# Patient Record
Sex: MALE
[De-identification: names, ages, dates, MRNs, and addresses within clinical notes are randomized per-mention and may not be internally consistent; named-entity substitution may affect disease eponyms.]

---

## 2018-08-16 ENCOUNTER — HOSPITAL ENCOUNTER (INPATIENT)
Dept: HOSPITAL 92 - ERS | Age: 43
LOS: 2 days | Discharge: HOME | DRG: 388 | End: 2018-08-18
Attending: HOSPITALIST | Admitting: HOSPITALIST
Payer: SELF-PAY

## 2018-08-16 VITALS — BODY MASS INDEX: 27.9 KG/M2

## 2018-08-16 DIAGNOSIS — E87.6: ICD-10-CM

## 2018-08-16 DIAGNOSIS — K57.31: ICD-10-CM

## 2018-08-16 DIAGNOSIS — K56.609: Primary | ICD-10-CM

## 2018-08-16 DIAGNOSIS — K64.8: ICD-10-CM

## 2018-08-16 DIAGNOSIS — D50.9: ICD-10-CM

## 2018-08-16 DIAGNOSIS — K62.5: ICD-10-CM

## 2018-08-16 DIAGNOSIS — E66.9: ICD-10-CM

## 2018-08-16 LAB
ALBUMIN SERPL BCG-MCNC: 4.1 G/DL (ref 3.5–5)
ALP SERPL-CCNC: 103 U/L (ref 40–150)
ALT SERPL W P-5'-P-CCNC: 31 U/L (ref 8–55)
ANION GAP SERPL CALC-SCNC: 11 MMOL/L (ref 10–20)
ANISOCYTOSIS BLD QL SMEAR: (no result) (100X)
APTT PPP: 23.9 SEC (ref 22.9–36.1)
AST SERPL-CCNC: 32 U/L (ref 5–34)
BASOPHILS # BLD AUTO: 0 THOU/UL (ref 0–0.2)
BASOPHILS NFR BLD AUTO: 0.4 % (ref 0–1)
BILIRUB SERPL-MCNC: 1.5 MG/DL (ref 0.2–1.2)
BUN SERPL-MCNC: 11 MG/DL (ref 8.9–20.6)
CALCIUM SERPL-MCNC: 8.7 MG/DL (ref 7.8–10.44)
CHLORIDE SERPL-SCNC: 103 MMOL/L (ref 98–107)
CK MB SERPL-MCNC: 2.4 NG/ML (ref 0–6.6)
CO2 SERPL-SCNC: 25 MMOL/L (ref 22–29)
CREAT CL PREDICTED SERPL C-G-VRATE: 0 ML/MIN (ref 70–130)
EOSINOPHIL # BLD AUTO: 0.1 THOU/UL (ref 0–0.7)
EOSINOPHIL NFR BLD AUTO: 2 % (ref 0–10)
GLOBULIN SER CALC-MCNC: 2.7 G/DL (ref 2.4–3.5)
GLUCOSE SERPL-MCNC: 141 MG/DL (ref 70–105)
HGB BLD-MCNC: 6.4 G/DL (ref 14–18)
HGB BLD-MCNC: 8.3 G/DL (ref 14–18)
INR PPP: 1.1
IRON SERPL-MCNC: 13 UG/DL (ref 65–175)
LIPASE SERPL-CCNC: 23 U/L (ref 8–78)
LYMPHOCYTES # BLD: 1 THOU/UL (ref 1.2–3.4)
LYMPHOCYTES NFR BLD AUTO: 18.8 % (ref 21–51)
MCH RBC QN AUTO: 16.3 PG (ref 27–31)
MCH RBC QN AUTO: 18.1 PG (ref 27–31)
MCV RBC AUTO: 56.6 FL (ref 78–98)
MCV RBC AUTO: 62.3 FL (ref 78–98)
MDIFF COMPLETE?: YES
MICROCYTES BLD QL SMEAR: (no result) (100X)
MONOCYTES # BLD AUTO: 0.4 THOU/UL (ref 0.11–0.59)
MONOCYTES NFR BLD AUTO: 7.3 % (ref 0–10)
NEUTROPHILS # BLD AUTO: 4 THOU/UL (ref 1.4–6.5)
NEUTROPHILS NFR BLD AUTO: 71.6 % (ref 42–75)
PLATELET # BLD AUTO: 194 THOU/UL (ref 130–400)
PLATELET # BLD AUTO: 195 THOU/UL (ref 130–400)
PLATELET BLD QL SMEAR: (no result)
POTASSIUM SERPL-SCNC: 3.1 MMOL/L (ref 3.5–5.1)
PROTHROMBIN TIME: 14.7 SEC (ref 12–14.7)
RBC # BLD AUTO: 3.93 MILL/UL (ref 4.7–6.1)
RBC # BLD AUTO: 4.59 MILL/UL (ref 4.7–6.1)
REFLEX FOR REVIEW??: YES
RETICS/RBC NFR: 1.7 % (ref 0.5–1.5)
SODIUM SERPL-SCNC: 136 MMOL/L (ref 136–145)
TROPONIN I SERPL DL<=0.01 NG/ML-MCNC: (no result) NG/ML (ref ?–0.03)
UIBC SERPL-MCNC: 424 MCG/DL (ref 261–462)
VIT B12 SERPL-MCNC: 385 PG/ML (ref 211–911)
WBC # BLD AUTO: 5.6 THOU/UL (ref 4.8–10.8)
WBC # BLD AUTO: 5.6 THOU/UL (ref 4.8–10.8)

## 2018-08-16 PROCEDURE — 85025 COMPLETE CBC W/AUTO DIFF WBC: CPT

## 2018-08-16 PROCEDURE — 84484 ASSAY OF TROPONIN QUANT: CPT

## 2018-08-16 PROCEDURE — 83690 ASSAY OF LIPASE: CPT

## 2018-08-16 PROCEDURE — P9016 RBC LEUKOCYTES REDUCED: HCPCS

## 2018-08-16 PROCEDURE — 82274 ASSAY TEST FOR BLOOD FECAL: CPT

## 2018-08-16 PROCEDURE — 30233N1 TRANSFUSION OF NONAUTOLOGOUS RED BLOOD CELLS INTO PERIPHERAL VEIN, PERCUTANEOUS APPROACH: ICD-10-PCS | Performed by: INTERNAL MEDICINE

## 2018-08-16 PROCEDURE — 86850 RBC ANTIBODY SCREEN: CPT

## 2018-08-16 PROCEDURE — 85610 PROTHROMBIN TIME: CPT

## 2018-08-16 PROCEDURE — 76705 ECHO EXAM OF ABDOMEN: CPT

## 2018-08-16 PROCEDURE — 82728 ASSAY OF FERRITIN: CPT

## 2018-08-16 PROCEDURE — 93005 ELECTROCARDIOGRAM TRACING: CPT

## 2018-08-16 PROCEDURE — 96366 THER/PROPH/DIAG IV INF ADDON: CPT

## 2018-08-16 PROCEDURE — 82607 VITAMIN B-12: CPT

## 2018-08-16 PROCEDURE — 85046 RETICYTE/HGB CONCENTRATE: CPT

## 2018-08-16 PROCEDURE — 83550 IRON BINDING TEST: CPT

## 2018-08-16 PROCEDURE — 85060 BLOOD SMEAR INTERPRETATION: CPT

## 2018-08-16 PROCEDURE — 80053 COMPREHEN METABOLIC PANEL: CPT

## 2018-08-16 PROCEDURE — 96365 THER/PROPH/DIAG IV INF INIT: CPT

## 2018-08-16 PROCEDURE — 82553 CREATINE MB FRACTION: CPT

## 2018-08-16 PROCEDURE — 85730 THROMBOPLASTIN TIME PARTIAL: CPT

## 2018-08-16 PROCEDURE — 82746 ASSAY OF FOLIC ACID SERUM: CPT

## 2018-08-16 PROCEDURE — 86901 BLOOD TYPING SEROLOGIC RH(D): CPT

## 2018-08-16 PROCEDURE — 96374 THER/PROPH/DIAG INJ IV PUSH: CPT

## 2018-08-16 PROCEDURE — 36430 TRANSFUSION BLD/BLD COMPNT: CPT

## 2018-08-16 PROCEDURE — 74177 CT ABD & PELVIS W/CONTRAST: CPT

## 2018-08-16 PROCEDURE — 88305 TISSUE EXAM BY PATHOLOGIST: CPT

## 2018-08-16 PROCEDURE — C9113 INJ PANTOPRAZOLE SODIUM, VIA: HCPCS

## 2018-08-16 PROCEDURE — 80048 BASIC METABOLIC PNL TOTAL CA: CPT

## 2018-08-16 PROCEDURE — 36415 COLL VENOUS BLD VENIPUNCTURE: CPT

## 2018-08-16 PROCEDURE — 83540 ASSAY OF IRON: CPT

## 2018-08-16 PROCEDURE — 74178 CT ABD&PLV WO CNTR FLWD CNTR: CPT

## 2018-08-16 PROCEDURE — 96375 TX/PRO/DX INJ NEW DRUG ADDON: CPT

## 2018-08-16 PROCEDURE — 71045 X-RAY EXAM CHEST 1 VIEW: CPT

## 2018-08-16 PROCEDURE — 96368 THER/DIAG CONCURRENT INF: CPT

## 2018-08-16 PROCEDURE — 86900 BLOOD TYPING SEROLOGIC ABO: CPT

## 2018-08-16 RX ADMIN — POTASSIUM CHLORIDE SCH: 14.9 INJECTION, SOLUTION INTRAVENOUS at 08:13

## 2018-08-16 RX ADMIN — Medication SCH ML: at 19:02

## 2018-08-16 RX ADMIN — Medication SCH ML: at 12:59

## 2018-08-16 RX ADMIN — POTASSIUM CHLORIDE SCH: 14.9 INJECTION, SOLUTION INTRAVENOUS at 12:58

## 2018-08-16 NOTE — RAD
ONE VIEW CHEST:

 

History: Dyspnea. 

 

Comparison: None. 

 

FINDINGS: 

One view chest. 

 

Normal cardiac silhouette. The pulmonary vessels are within normal limits. Costophrenic angles are cl
ear. No consolidation or mass. No pneumothorax or osseous abnormality. 

 

IMPRESSION: 

No acute cardiopulmonary process. 

 

POS: [facility]

## 2018-08-16 NOTE — HP
PRIMARY CARE PROVIDER:  [facility].

 

CHIEF COMPLAINT:  Abdominal pain.

 

HISTORY OF PRESENT ILLNESS:  Mr. Koehler is a pleasant 43-year-old gentleman who was seen at [facility] on 08/16/2018.

 

He reports that he has been having on and off bloody bowel movements over the last several weeks.  He
 describes it as watery stools accompanied by blood.  He denies any fevers or chills.

 

Today, he was woken up from sleep with sharp right upper quadrant pain, 10/10, nonradiating, constant
, accompanied by shortness of breath, no known aggravating or relieving factors.  He reports nausea, 
but no vomiting.  He came to the emergency room because of ongoing abdominal pain.  He reports eating
 at [employer] before the symptoms started.

 

REVIEW OF SYSTEMS:  All other systems reviewed and found to be negative.

 

PAST MEDICAL HISTORY:  None.

 

PAST SURGICAL HISTORY:  None.

 

SOCIAL HISTORY:  Occasional alcohol use, no tobacco use or recreational drug use.

 

FAMILY HISTORY:  Sister with uterine cancer, brother with cancer of the face.

 

ALLERGIES:  No known drug allergies.

 

CURRENT MEDICATIONS:  None.

 

PHYSICAL EXAMINATION:

GENERAL:  On examination, Mr. Koehler is awake and alert, not in acute distress.

VITAL SIGNS:  Blood pressure is 115/72, pulse 62, respiratory rate 20, and oxygen saturation 100% on 
3 liters oxygen by nasal cannula.  He is afebrile.

EYES:  No scleral icterus.  No conjunctival pallor.

ENT:  Moist mucosal membranes, no oropharyngeal erythema or exudates.

NECK:  Supple, nontender, trachea is midline.

RESPIRATORY:  Accessory muscles of breathing are not active.  Chest wall movements are symmetric bila
terally.

LUNGS:  Clear to auscultation without wheeze, rhonchi or crepitations.

CARDIOVASCULAR:  S1 and S2 are heard, regular.  Peripheral pulses palpable.  No carotid bruit, no per
icardial rub.

ABDOMEN:  Right upper quadrant is mildly tender.  There is no guarding or rigidity.  Bowel sounds are
 heard.  No hepatomegaly or splenomegaly.  Carlisle' sign is negative.

NEUROLOGIC:  Cranial nerves II-XII intact.  Deep tendon reflexes are 2+.

MUSCULOSKELETAL:  Power is 5/5 in all 4 extremities.

LYMPHATIC:  No cervical lymphadenopathy.

SKIN:  No rashes or subcutaneous nodules.

PSYCHIATRIC:  Normal mood, normal affect, patient is oriented to person, place, and time.

 

LABORATORY DATA:  Mr. Koehler's labs and investigations were reviewed.  He has normal white count, mi
crocytic anemia with hemoglobin 6.4, normal platelet count, INR 1.1, normal sodium, decreased potassi
um of 3.1, normal creatinine, elevated total bilirubin of 1.5, otherwise unremarkable liver profile a
nd normal calcium.

 

I reviewed his electrocardiogram, which shows normal sinus rhythm, no ST changes to suggest an acute 
coronary syndrome.  I also reviewed his chest x-ray, which does not show any pulmonary infiltrates.  
CT scan of the abdomen and pelvis showed dilatation of the small bowel up to 3.6 cm with abrupt colla
pse of the jejunum in the left upper quadrant, suspicious for small-bowel obstruction.

 

ASSESSMENT AND PLAN:  Mr. Koehler  is a pleasant 43-year-old gentleman who was seen at [facility] on 08/16/2018.  His problem list includes:

1.  Abdominal pain:  Etiology unclear at this time, could be related to bowel obstruction.  Patient w
ill be admitted to the hospital for further management.  General Surgery Service has been consulted. 
 They recommend Gastroenterology evaluation.  We will await the same.

2.  Bloody diarrhea.  Patient will likely need scope studies to rule out inflammatory bowel disease. 
 There is no evidence of infection at this time.

3.  Symptomatic anemia:  Patient has received packed RBC transfusion.  We will follow hemoglobin.

4.  Hypokalemia:  Replace potassium, recheck potassium level.

 

We will order iron studies, vitamin B12 and folate levels.

 

Many thanks for allowing me to participate in your patient's care.  Please feel free to contact me wi
th any questions or concerns.

 

LEVEL OF RISK:  High.

 

LEVEL OF COMPLEXITY:  Intermediate.

## 2018-08-16 NOTE — CT
PRELIMINARY REPORT/[facility]/EMERGENTY AFTER-HOURS PROCEDURE 

 

 

CT Abdomen and Pelvis With Intravenous Contrast

 

EXAM DATE/TIME:

Exam ordered 8/16/2018 3:33 AM

 

CLINICAL HISTORY:

43 years old, male; Pain; Abdominal pain; Epigastric; Patient HX: Er 2; M43 presents to the ed C/O ab
dominal pain that started 1 hour ago. Pt. Describes pain as sharp and being located in epigastric are
a pt. States that he was laying down when the pain started. Pt. Reports n/v and SOB. Pt. Reports

that he had a meal from [employer] before the symptoms had started. Pt. Denies having any chronic he
alth complications.

 

TECHNIQUE:

Axial computed tomography images of the abdomen and pelvis with intravenous contrast.

Coronal reformatted images were created and reviewed.

 

COMPARISON:

No relevant prior studies available.

 

FINDINGS:

Lung bases: There is subpleural atelectasis of the dependent portions of the lungs.

 

ABDOMEN:

Liver: There are no focal liver lesions identified.

Gallbladder and bile ducts: The gallbladder is normal. There is no evidence of biliary ductal dilatio
n. No calcified stones.

Pancreas: The pancreas appears normal. No ductal dilation.

Spleen: The spleen is normal.

Adrenals: The adrenal glands are normal.

Kidneys and ureters: The kidneys appear normal. No hydronephrosis.

Stomach and bowel: There is dilatation of the small bowel up to 3.6 cm with abrupt collapse of the je
junum in the LEFT upper quadrant suspicious for small bowel obstruction. The stomach is normal. The d
uodenum is unremarkable. The colon is normal. No mucosal thickening.

 

PELVIS:

Appendix: A normal appendix is identified.

Bladder: The bladder is normal.

Reproductive: The prostate gland and seminal vesicles are normal.

 

ABDOMEN and PELVIS:

Intraperitoneal space: Normal. No free air. No significant fluid collection.

Bones/joints: No acute fracture. No dislocation.

Soft tissues: Normal.

Vasculature: Normal. No abdominal aortic aneurysm.

Lymph nodes: Normal. No enlarged lymph nodes.

 

IMPRESSION:

There is dilatation of the small bowel up to 3.6 cm with abrupt collapse of the jejunum in the LEFT u
pper quadrant suspicious for small bowel obstruction.

 

Thank you for allowing us to participate in the care of your patient.

Dictated and Authenticated by: Juan Horne MD

08/16/2018 5:16 AM Central Time ([place] & [place])

 

 

CT ABDOMEN AND PELVIS WITH IV CONTRAST:

 

FINAL REPORT:

I agree with the preliminary report given by Dr. Horne of [facility].

 

POS: [facility]

## 2018-08-16 NOTE — PDOC.GSCN
Surgery Consult: HPI





- Consult details


Date: 08/16/18


Time: 10:00


Reason for consult: small bowel obstruction


History of present illness: 





Patient is a 43 year old male  that presents after having sharp abdominal pain 

this morning at 2am. Pain located in RUQ and radiates to epigastric area. 

Patient has had this pain in the past, but not as severe. Ate a hamburger at 

[employer] yesterday evening. Abdominal pain has since improved. Endorses 

nausea following the meal. Bowel function unchanged. Denies weight loss. 

Reports bright red blood per rectum.





08/16/18 11:06








Surgery Consult: ROS





- Review of Systems


All systems: 10 systems reviewed and no additional complaints unless stated 

below.





Surgery Consult: [facility]


Source: patient


Past Medical History: 





Denies


Past Surgical History: 





None





- Past Family History


Family history: reviewed and not pertinent (No family history of colon cancer.)





- Past Social History


Smoking Status: Never smoker


Alcohol Use: other (endorses drinking every other day)


Drug Use History: none


Living Situation: 





Surgery Consult: Exam





- Vital signs


Vital signs: 


 Vital Signs - Most Recent











Temp Pulse Resp BP Pulse Ox


 


 98.1 F   62   20   115/72   100 


 


 08/16/18 07:59  08/16/18 07:59  08/16/18 07:59  08/16/18 07:59  08/16/18 07:59














- Physical Exam


General: no distress, well developed, well nourished


Eye: normal ocular movement


Respiratory: normal expansion, normal respiratory effort


Abdomen: soft, tender, bowel sounds


Neurologic: normal coordination


Psychiatric: memory intact, speech is normal





Surgery Consult: Meds





- Medications


MAR Reviewed: Yes


Medications: 


 Current Medications





Acetaminophen (Tylenol)  650 mg [place] Q4H PRN


   PRN Reason: Headache/Fever or Pain


Ondansetron HCl (Zofran)  4 mg IVP Q6H PRN


   PRN Reason: Nausea/Vomiting


   Stop: 08/16/18 18:00


Ondansetron HCl (Zofran Odt)  4 mg PO Q6H PRN


   PRN Reason: Nausea/Vomiting


   Stop: 08/16/18 18:00


Sodium Chloride (Flush - Normal Saline)  10 ml IVF Q12HR KEL


Sodium Chloride (Flush - Normal Saline)  10 ml IVF PRN PRN


   PRN Reason: Saline Flush











- Allergies


Allergies/Adverse Reactions: 


 Allergies











Allergy/AdvReac Type Severity Reaction Status Date / Time


 


No Known Drug Allergies Allergy   Verified 08/16/18 08:14














Surgery Consult: Results





- Labs


Result Diagrams: 


 08/16/18 10:03





 08/16/18 03:05


Lab results: 


 Laboratory Results











WBC  5.6 thou/uL (4.8-10.8)   08/16/18  10:03    


 


RBC  4.59 mill/uL (4.70-6.10)  L  08/16/18  10:03    


 


Hgb  8.3 g/dL (14.0-18.0)  L  08/16/18  10:03    


 


Hct  28.6 % (42.0-52.0)  L  08/16/18  10:03    


 


MCV  62.3 fL (78.0-98.0)  L  08/16/18  10:03    


 


MCH  18.1 pg (27.0-31.0)  L  08/16/18  10:03    


 


MCHC  29.1 g/dL (32.0-36.0)  L  08/16/18  10:03    


 


RDW  24.6 % (11.5-14.5)  H  08/16/18  10:03    


 


Plt Count  194 thou/uL (130-400)   08/16/18  10:03    


 


MPV  6.3 fL (7.4-10.4)  L  08/16/18  10:03    


 


Neutrophils %  71.6 % (42.0-75.0)   08/16/18  10:03    


 


Neutrophils % (Manual)  43 % (42-75)   08/16/18  03:05    


 


Band Neuts % (Manual)  1 % (5-11)  L  08/16/18  03:05    


 


Lymphocytes %  18.8 % (21.0-51.0)  L  08/16/18  10:03    


 


Lymphocytes % (Manual)  49 % (21-51)   08/16/18  03:05    


 


Monocytes %  7.3 % (0.0-10.0)   08/16/18  10:03    


 


Monocytes % (Manual)  5 % (0-10)   08/16/18  03:05    


 


Eosinophils %  2.0 % (0.0-10.0)   08/16/18  10:03    


 


Eosinophils % (Manual)  1 % (0-10)   08/16/18  03:05    


 


Basophils %  0.4 % (0.0-1.0)   08/16/18  10:03    


 


Basophils % (Manual)  1 % (0-2)   08/16/18  03:05    


 


Neutrophils #  4.0 thou/uL (1.40-6.50)   08/16/18  10:03    


 


Lymphocytes #  1.0 thou/uL (1.20-3.40)  L  08/16/18  10:03    


 


Monocytes #  0.4 thou/uL (0.11-0.59)   08/16/18  10:03    


 


Eosinophils #  0.1 thou/uL (0.0-0.7)   08/16/18  10:03    


 


Basophils #  0.0 thou/uL (0.0-0.2)   08/16/18  10:03    


 


Hypochromia  MODERATE=16-30 cells (100X) (0-5/hpf)   08/16/18  03:05    


 


Plt Morphology Comment  Appears Adequate   08/16/18  03:05    


 


Anisocytosis  SLIGHT = 6-15 cells (100X) (0-5/hpf)   08/16/18  03:05    


 


Microcytosis  MARKED = >30 cells (100X) (0-5/hpf)   08/16/18  03:05    


 


Elliptocytes  SLIGHT = 2-5 cells (100X) (0-1/hpf)   08/16/18  03:05    


 


Smear Path Review     08/16/18  03:05    


 


Retic Count  1.7 % (0.5-1.5)  H  08/16/18  10:03    


 


Immature Retic Fraction  0.482 Ratio (0.163-0.362)  H  08/16/18  10:03    


 


PT  14.7 SEC (12.0-14.7)   08/16/18  03:05    


 


INR  1.1   08/16/18  03:05    


 


APTT  23.9 SEC (22.9-36.1)   08/16/18  03:05    


 


Sodium  136 mmol/L (136-145)   08/16/18  03:05    


 


Potassium  3.1 mmol/L (3.5-5.1)  L  08/16/18  03:05    


 


Chloride  103 mmol/L ()   08/16/18  03:05    


 


Carbon Dioxide  25 mmol/L (22-29)   08/16/18  03:05    


 


Anion Gap  11 mmol/L (10-20)   08/16/18  03:05    


 


BUN  11 mg/dL (8.9-20.6)   08/16/18  03:05    


 


Creatinine  0.80 mg/dL (0.6-1.3)   08/16/18  03:05    


 


Estimated GFR (MDRD)  Greater than  90   08/16/18  03:05    


 


Glucose  141 mg/dL ()  H  08/16/18  03:05    


 


Calcium  8.7 mg/dL (7.8-10.44)   08/16/18  03:05    


 


Iron  13 ug/dL ()  L  08/16/18  10:03    


 


TIBC  424 mcg/dL (261-462)   08/16/18  10:03    


 


Total Bilirubin  1.5 mg/dL (0.2-1.2)  H  08/16/18  03:05    


 


AST  32 U/L (5-34)   08/16/18  03:05    


 


ALT  31 U/L (8-55)   08/16/18  03:05    


 


Alkaline Phosphatase  103 U/L ()   08/16/18  03:05    


 


CK-MB (CK-2)  2.4 ng/mL (0-6.6)   08/16/18  03:05    


 


Troponin I  Less than  0.010 ng/mL (< 0.028)   08/16/18  03:05    


 


Serum Total Protein  6.8 g/dL (6.0-8.3)   08/16/18  03:05    


 


Albumin  4.1 g/dL (3.5-5.0)   08/16/18  03:05    


 


Globulin  2.7 g/dL (2.4-3.5)   08/16/18  03:05    


 


Albumin/Globulin Ratio  1.5 g/dL (1.2-2.2)   08/16/18  03:05    


 


Lipase  23 U/L (8-78)   08/16/18  03:05    


 


Blood Type  A POSITIVE   08/16/18  03:35    


 


Antibody Screen  NEGATIVE   08/16/18  03:35    


 


Crossmatch  See Detail   08/16/18  03:35    














Surgery Consult: A/P





- Plan


Plan: 





Consulted for concern of SBO. Reviewed imaging. Considering patient's age and 

no previous surgeries, SBO less likely. Abdominal pain symptoms could be 

suspicious for biliary colic. He has never had a RUQ ultrasound, so one has 

been ordered to evaluate for cause of his pain. Recommend GI consult for workup 

of patient's bloody stools and microcytic anemia. 





Patient has been seen and examined by Dr. Nava, who agrees with plan as noted 

above.

## 2018-08-16 NOTE — ULT
SONOGRAM RIGHT UPPER QUADRANT:

 

History: Right upper quadrant pain. 

 

FINDINGS: 

Gallbladder has a normal appearance. Common duct is 0.5 cm. Liver echogenic without focal mass or int
rahepatic biliary dilatation. No free fluid. 

 

IMPRESSION: 

1. No evidence of gallstones or acute biliary obstruction. 

2. Hepatosteatosis. 

 

POS: [facility]

## 2018-08-16 NOTE — CON
DATE OF CONSULTATION:  08/16/2018

 

GASTROENTEROLOGY CONSULTATION NOTE

 

CHIEF COMPLAINT:  Abdominal pain.

 

HISTORY OF PRESENT ILLNESS:  Mr. Koehler is a 43-year-old man who presented to the emergency room ear
 this morning after he had a sudden onset of right upper quadrant epigastric sharp abdominal pain. 
 The pain was exacerbated by taking a deep breath and movement.  The pain started suddenly about 2:00
 a.m. and then remained constant until he received pain medicine in the emergency room.  He has had s
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  He was found to have severe anemia when he was in [facility] emergency room.  He does report chronic hematochezia.  He sees red blood that will turn the water 
red around once or twice per week as he has a bowel movement.  He had some procedure for hemorrhoids 
back in 2014 in the emergency room, possibly evacuation of clot and external hemorrhoids; however, it
 is unclear exactly.  He has had no diarrhea or constipation lately.  His last bowel movement was a f
ormed normal bowel movement yesterday.  He had no prior endoscopic procedures.

 

PAST MEDICAL HISTORY:  Hemorrhoids

 

PAST SURGICAL HISTORY:  None.

 

FAMILY HISTORY:  Negative for GI malignancy.

 

SOCIAL HISTORY:  He had a half-sister with ovarian or uterine cancer.

 

HABITS:  He has 3 or 4 beers every couple of days.  No smoking or drugs.

 

ALLERGIES:  No known drug allergies.

 

MEDICATIONS PRIOR TO ADMISSION:  None.  No significant NSAID use.

 

REVIEW OF SYSTEMS:  Negative x10 systems reviewed except as stated in history of present illness.

 

PHYSICAL EXAMINATION:

VITAL SIGNS:  Temperature 98.1, pulse 62, blood pressure 115/72.

GENERAL:  He is in no acute distress, alert and oriented x3.

OROPHARYNX:  Clear, without lesions.

HEENT:  Eyes have no scleral icterus.  He has no cervical or supraclavicular lymphadenopathy.

LUNGS:  Clear to auscultation bilaterally.

HEART:  Regular rate and rhythm without murmur.

ABDOMEN:  Soft, minimal tenderness in the right upper quadrant.  Bowel sounds are present.

EXTREMITIES:  No lower extremity edema.

NEUROLOGIC:  Cranial nerves are grossly intact.

 

LABORATORY DATA:  White blood cell count 5.6, hemoglobin is 8.3 after 2 units transfusion, platelets 
194, MCV 56.  Hemoglobin on presentation was 6.4, retic count was elevated at 1.7.  INR 1.1, creatini
ne 0.8.  Iron 13, TIBC 424, bilirubin 1.5, AST 32, ALT 31, alkaline phosphatase 103, albumin 4.1, lip
ase 23.

 

IMPRESSION:

1.  Severe iron deficiency anemia.  He does have some chronic hematochezia with red blood in the toil
et around once or twice per week, most likely related to hemorrhoids.  We will need to rule out lower
 GI bleeding source.  It would be unusual for this degree of bleeding to cause such a severe anemia a
s this is still possible source.

2.  Abnormal CT scan of the abdomen showing dilation of the small bowel up to 3.6 cm with abrupt maggie
apse and jejunum in the left upper quadrant, question of a small bowel lesion or tumor could be possi
ble as a source for iron deficiency anemia.

3.  Right upper quadrant pain.  This was sudden onset and has gone this morning.

 

RECOMMENDATIONS:

1.  EGD and colonoscopy tomorrow.

2.  Ultrasound of the right upper quadrant has been ordered to evaluate the right upper quadrant pain
.

3.  If the EGD and colonoscopy are negative, then the next step will be CT enterography to evaluate f
or small bowel lesion further.  If no significant stricture is seen at this point, then a capsule end
oscopy could be performed as an outpatient.  Ultimately, this could require surgical evaluation.

## 2018-08-17 LAB
ANION GAP SERPL CALC-SCNC: 14 MMOL/L (ref 10–20)
ANISOCYTOSIS BLD QL SMEAR: (no result) (100X)
BASOPHILS # BLD AUTO: 0 THOU/UL (ref 0–0.2)
BASOPHILS NFR BLD AUTO: 0.7 % (ref 0–1)
BUN SERPL-MCNC: 7 MG/DL (ref 8.9–20.6)
CALCIUM SERPL-MCNC: 9 MG/DL (ref 7.8–10.44)
CHLORIDE SERPL-SCNC: 105 MMOL/L (ref 98–107)
CO2 SERPL-SCNC: 24 MMOL/L (ref 22–29)
CREAT CL PREDICTED SERPL C-G-VRATE: 204 ML/MIN (ref 70–130)
EOSINOPHIL # BLD AUTO: 0.2 THOU/UL (ref 0–0.7)
EOSINOPHIL NFR BLD AUTO: 4 % (ref 0–10)
GLUCOSE SERPL-MCNC: 86 MG/DL (ref 70–105)
HGB BLD-MCNC: 8 G/DL (ref 14–18)
LYMPHOCYTES # BLD: 1.5 THOU/UL (ref 1.2–3.4)
LYMPHOCYTES NFR BLD AUTO: 30.3 % (ref 21–51)
MCH RBC QN AUTO: 18.7 PG (ref 27–31)
MCV RBC AUTO: 61.8 FL (ref 78–98)
MDIFF COMPLETE?: YES
MONOCYTES # BLD AUTO: 0.5 THOU/UL (ref 0.11–0.59)
MONOCYTES NFR BLD AUTO: 9.9 % (ref 0–10)
NEUTROPHILS # BLD AUTO: 2.7 THOU/UL (ref 1.4–6.5)
NEUTROPHILS NFR BLD AUTO: 55.2 % (ref 42–75)
OVALOCYTES BLD QL SMEAR: (no result) (100X)
PLATELET # BLD AUTO: 173 THOU/UL (ref 130–400)
PLATELET BLD QL SMEAR: (no result)
POTASSIUM SERPL-SCNC: 3.6 MMOL/L (ref 3.5–5.1)
RBC # BLD AUTO: 4.29 MILL/UL (ref 4.7–6.1)
SODIUM SERPL-SCNC: 139 MMOL/L (ref 136–145)
WBC # BLD AUTO: 5 THOU/UL (ref 4.8–10.8)

## 2018-08-17 PROCEDURE — 0DJD8ZZ INSPECTION OF LOWER INTESTINAL TRACT, VIA NATURAL OR ARTIFICIAL OPENING ENDOSCOPIC: ICD-10-PCS | Performed by: INTERNAL MEDICINE

## 2018-08-17 PROCEDURE — 0DB98ZX EXCISION OF DUODENUM, VIA NATURAL OR ARTIFICIAL OPENING ENDOSCOPIC, DIAGNOSTIC: ICD-10-PCS | Performed by: INTERNAL MEDICINE

## 2018-08-17 RX ADMIN — Medication SCH: at 10:10

## 2018-08-17 RX ADMIN — Medication SCH ML: at 21:57

## 2018-08-17 NOTE — PDOC.PN
- Subjective


Encounter Start Date: 08/17/18


Encounter Start Time: 09:00





Pt seen for followup re: abdominal pain.  Denies chest pain, shortness fo breath

, fevers or chills.  No nausea or vomiting.





- Objective


Vital Signs & Weight: 


 Vital Signs (12 hours)











  Temp Pulse Resp BP BP BP Pulse Ox


 


 08/17/18 11:57  97.5 F L  63  16   143/93 H   100


 


 08/17/18 09:13        98


 


 08/17/18 08:00  97.7 F  61  16     96


 


 08/17/18 07:50  97.7 F  61  16    115/75  96


 


 08/17/18 04:27  97.7 F  61  16  116/69    98








 Weight











Weight                         255 lb 11.779 oz














I&O: 


 











 08/16/18 08/17/18 08/18/18





 06:59 06:59 06:59


 


Intake Total  2050 


 


Balance  2050 











Result Diagrams: 


 08/17/18 03:50





 08/17/18 03:50


Additional Labs: 





Labs reviewed by me





Phys Exam





- Physical Examination


Constitutional: NAD


HEENT: moist MMs, sclera anicteric, oral pharynx no lesions, 2+ tonsils


Neck: no nodes, no JVD, supple, full ROM


Respiratory: no wheezing, no rales, no rhonchi, clear to auscultation bilateral


Cardiovascular: RRR, no rub


S1, S2


Gastrointestinal: soft, no distention, positive bowel sounds


Mild RUQ tenderness, no guarding or rigidity


Neurological: moves all 4 limbs


Psychiatric: normal affect, A&O x 3





Dx/Plan


(1) Abdominal pain


Code(s): R10.9 - UNSPECIFIED ABDOMINAL PAIN   Status: Acute   Comment: Pt going 

for EGD/C-scope today   





(2) Symptomatic anemia


Code(s): D64.9 - ANEMIA, UNSPECIFIED   Status: Acute   Comment: s/p pRBC 

transfusion   





(3) Iron deficiency


Code(s): E61.1 - IRON DEFICIENCY   Status: Chronic   Comment: s/p iron infusion

   





(4) Hypokalemia


Code(s): E87.6 - HYPOKALEMIA   Status: Resolved   





- Plan





* .








Review of Systems





- Review of Systems


Constitutional: negative: fever, chills, sweats, weakness, malaise


Respiratory: negative: Cough, Shortness of Breath, SOB with Excertion, 

Pleuritic Pain, Wheezing


Cardiovascular: negative: chest pain, palpitations, orthopnea, paroxysmal 

nocturnal dyspnea, edema, light headedness


Gastrointestinal: Abdominal Pain.  negative: Nausea, Vomiting, Diarrhea, 

Constipation, Melena, Hematochezia


Skin: negative: Rash, Lesions, Vimal, Bruising





- Medications/Allergies


Allergies/Adverse Reactions: 


 Allergies











Allergy/AdvReac Type Severity Reaction Status Date / Time


 


No Known Drug Allergies Allergy   Verified 08/16/18 08:14











Medications: 


 Current Medications





Acetaminophen (Tylenol)  650 mg [place] Q4H PRN


   PRN Reason: Headache/Fever or Pain


Sodium Chloride (Flush - Normal Saline)  10 ml IVF Q12HR KEL


   Last Admin: 08/17/18 10:10 Dose:  Not Given


Sodium Chloride (Flush - Normal Saline)  10 ml IVF PRN PRN


   PRN Reason: Saline Flush

## 2018-08-17 NOTE — OP
DATE OF PROCEDURE:  08/17/2019

 

PROCEDURE:  Esophagogastroduodenoscopy with biopsy and colonoscopy.

 

PREOPERATIVE DIAGNOSIS:  Iron deficiency anemia and abnormal CT scan of the abdomen.

 

OPERATIVE NOTE:  Informed consent was obtained from the patient.  He was sedated with total intraveno
us anesthesia.  The bite block was placed and the endoscope was advanced easily to the second portion
 of the duodenum and retroflexion was performed in the stomach.  The esophagus was normal.  The GE ju
nction was normal.  The stomach was normal including retroflexed views.  The first, second, third, an
d fourth portions of the duodenum were normal.  Biopsies were taken from the duodenum to rule out abbie
iac disease.  Patient was turned around.  Rectal exam was performed and was normal.  The colonoscope 
was advanced to the terminal ileum without difficulty.  The mucosa of the terminal ileum was normal. 
 There was mild diverticulosis of the left colon, descending and sigmoid colon.  Moderate internal he
morrhoids were present and noted on retroflexed views.  The remainder of the colonic mucosa was tho
l.  Preparation quality was good.

 

IMPRESSION:

1.  Normal esophagogastroduodenoscopy to the fourth portion of the duodenum.  Biopsies were taken fro
m the duodenum.

2.  Mild diverticulosis of the left colon.

3.  Moderate internal hemorrhoids.

4.  Otherwise normal colonoscopy to the terminal ileum.

 

RECOMMENDATIONS:

1.  CT enterography to evaluate for small bowel bleeding source.  There was a possible lesion noted b
y CT scan in the small bowel.  If CT enterography does show an obvious lesion here, then next step wi
ll be surgery.

2.  Repeat colonoscopy in 10 years for colon cancer screening.

3.  If the CT enterography is normal, then capsule endoscopy could be considered as the next step.

## 2018-08-17 NOTE — CT
CT ABDOMEN AND PELVIS WITH AND WITHOUT CONTRAST:

 

HISTORY:  

Abdominal pain.

 

COMPARISON: 

CT yesterday.

 

FINDINGS: 

The patient did not drink the oral contrast.  This is not enterography protocol, which is adequately 
performed.

 

Lung bases are clear.  There is no nephroureteral lithiasis or hydroureteral nephrosis.  No secondary
 evidence of a recently passed stone.

 

No dilated loops of large or small bowel.  The previously described proximal small bowel dilatation h
as resolved.  The aortoiliac contour is normal.  Small periaortic retroperitoneal lymph nodes are pre
sent.

 

The skeleton is unremarkable.

 

IMPRESSION: 

1.  Interval resolution of the left upper quadrant bowel dilatation.

 

2.  The exam was not performed with enterography protocol as the patient did not drink the oral contr
ast.

 

POS: [facility]

## 2018-08-18 VITALS — TEMPERATURE: 97.9 F | DIASTOLIC BLOOD PRESSURE: 84 MMHG | SYSTOLIC BLOOD PRESSURE: 153 MMHG

## 2018-08-18 LAB
ANION GAP SERPL CALC-SCNC: 11 MMOL/L (ref 10–20)
ANISOCYTOSIS BLD QL SMEAR: (no result) (100X)
BASOPHILS # BLD AUTO: 0 THOU/UL (ref 0–0.2)
BASOPHILS NFR BLD AUTO: 0.6 % (ref 0–1)
BUN SERPL-MCNC: 7 MG/DL (ref 8.9–20.6)
CALCIUM SERPL-MCNC: 9 MG/DL (ref 7.8–10.44)
CHLORIDE SERPL-SCNC: 104 MMOL/L (ref 98–107)
CO2 SERPL-SCNC: 26 MMOL/L (ref 22–29)
CREAT CL PREDICTED SERPL C-G-VRATE: 199 ML/MIN (ref 70–130)
EOSINOPHIL # BLD AUTO: 0.4 THOU/UL (ref 0–0.7)
EOSINOPHIL NFR BLD AUTO: 5.5 % (ref 0–10)
GLUCOSE SERPL-MCNC: 88 MG/DL (ref 70–105)
HGB BLD-MCNC: 8.5 G/DL (ref 14–18)
LYMPHOCYTES # BLD: 1.9 THOU/UL (ref 1.2–3.4)
LYMPHOCYTES NFR BLD AUTO: 29.3 % (ref 21–51)
MCH RBC QN AUTO: 19 PG (ref 27–31)
MCV RBC AUTO: 61.9 FL (ref 78–98)
MDIFF COMPLETE?: YES
MICROCYTES BLD QL SMEAR: (no result) (100X)
MONOCYTES # BLD AUTO: 0.7 THOU/UL (ref 0.11–0.59)
MONOCYTES NFR BLD AUTO: 10.2 % (ref 0–10)
NEUTROPHILS # BLD AUTO: 3.5 THOU/UL (ref 1.4–6.5)
NEUTROPHILS NFR BLD AUTO: 54.4 % (ref 42–75)
PLATELET # BLD AUTO: 209 THOU/UL (ref 130–400)
POTASSIUM SERPL-SCNC: 3.5 MMOL/L (ref 3.5–5.1)
RBC # BLD AUTO: 4.45 MILL/UL (ref 4.7–6.1)
SODIUM SERPL-SCNC: 137 MMOL/L (ref 136–145)
WBC # BLD AUTO: 6.4 THOU/UL (ref 4.8–10.8)

## 2018-08-18 RX ADMIN — Medication SCH: at 08:29

## 2018-08-18 NOTE — PRG
DATE OF SERVICE:  08/18/2018

 

SUBJECTIVE:  This is a 43-year-old male that our team is seeing in consultation for right upper quadr
ant pain and possible mass.  The patient was seen and evaluated by GI.  He has recently undergone a C
T of the abdomen and pelvis with enterography.  Results are still pending.  Patient underwent EGD and
 colonoscopy on 08/17/2018 which demonstrated diverticulosis and internal hemorrhoids.  Upon my evalu
ation, the patient vocalized no complaints and states right upper quadrant abdominal pain has improve
d.

 

OBJECTIVE:

VITAL SIGNS:  Temperature 97.9, pulse 80, respirations 16, O2 sat 94% on room air, blood pressure 153
/84.

GENERAL:  Well-developed male in no acute distress, sitting on the edge of the bed.

HEAD:  Normocephalic, atraumatic.

PULMONARY:  Normal work of breathing.  Symmetric rise.

CARDIOVASCULAR:  Regular rate and rhythm.

GASTROINTESTINAL:  Abdomen is soft, nontender, nondistended.  Bowel sounds are positive.

MUSCULOSKELETAL:  Moves all extremities x4.

NEUROLOGIC:  No focal deficit noted.

 

LABORATORY FINDINGS:  WBC 6.4, hemoglobin 8.5, hematocrit 27.6, platelet count 209.  Sodium 137, pota
ssium 3.5, chloride 104, carbon dioxide 26, BUN 7, creatinine 0.77, glucose 88.

 

ASSESSMENT:

1.  Right upper quadrant pain, resolved.

2.  Microcytic anemia with bloody stools.

 

PLAN:  Follow up abdomen and pelvis CT.  If no evidence of suspicious mass, Surgery will sign off at 
this time as there is no acute surgical indication.  The patient is being evaluated by GI and will ha
ve outpatient followup with them regarding his microcytic anemia.

 

Plan of care was discussed with the patient at bedside.  All questions were answered at the time of t
his dictation.  Patient has been discussed with Dr. Nava.

## 2018-08-18 NOTE — DIS
DATE OF ADMISSION:  08/16/2018

 

DATE OF DISCHARGE:  08/18/2018

 

PRIMARY CARE PROVIDER:  Audi Howell.

 

DISCHARGE DIAGNOSES:

1.  Abdominal pain.

2.  Iron deficiency anemia.

3.  Bowel obstruction.

4.  Descending colon diverticulosis.

5.  Rectal bleed.

6.  Hypokalemia.

7.  Iron deficiency.

8.  Symptomatic anemia.

 

CONDITION OF PATIENT ON THE DAY OF DISCHARGE:  Stable.  I assessed Mr. Koehler on the day of discharg
e.  He denies any chest pain or shortness of breath.  He denies any abdominal pain.  Vital signs are 
stable.  S1 and S2 are heard, regular.  Lungs are clear to auscultation bilaterally.

 

CONSULTATIONS DURING THIS HOSPITALIZATION:  General surgery, Dr. Hua Nava; and Gastroenterology,
 Dr. Zack Fang.

 

DISCHARGE MEDICATIONS:  Ferrous sulfate 325 mg daily.

 

HOSPITAL COURSE:  Mr. Koehler is a pleasant 43-year-old gentleman, who was admitted to [facility] on 08/16/2018 for abdominal pain and symptomatic anemia,

 

CT scan of the abdomen and pelvis done at the time of admission was suspicious for bowel obstruction.
  He was seen by General Surgery Service as well as by Gastroenterology Service.  He underwent EGD an
d colonoscopy on 08/17/2018.  He had a normal EGD to the fourth portion of the duodenum.  Biopsies we
re taken from the duodenum.  He also had mild diverticulosis of the left colon and moderate internal 
hemorrhoids.  Following the scope studies, he underwent CT enterography to evaluate for small bowel b
leeding source.  He had it done on 08/17/2018; however, it was done without oral contrast.  He was no
jason to have resolution of left upper quadrant bowel dilatation on that study.  He went on to have CT 
enterography on 08/18/2018, which was normal.  He had some descending colonic diverticulosis.

 

He was found to have iron deficiency, with iron level of 13, TIBC 424, and ferritin 3.01.  He receive
d intravenous iron infusion.  He is now being discharged home on oral iron at home.

 

During this hospitalization, he also had packed RBC transfusion.  At the time of admission, he had he
moglobin 6.4.  On the day of discharge, his hemoglobin 8.5, white count 6400, platelet count 209,000,
 normal electrolytes and normal creatinine.  Vitamin B12 and folate levels were normal during this ho
spitalization.

 

Many thanks for allowing me to participate in your patient's care.  Please feel free to contact me wi
th any questions or concerns.

 

DISCHARGE DESTINATION:  Home.

 

TOTAL AMOUNT OF TIME SPENT COORDINATING THIS DISCHARGE:  33 minutes.

## 2018-08-18 NOTE — PRG
DATE OF SERVICE:  08/18/2018

 

SUBJECTIVE:  Mr. Koehler has no complaints.  He has been tolerating a solid diet.  No abdominal pain 
or nausea or vomiting.

 

OBJECTIVE:

VITAL SIGNS:  Temperature 97.9, pulse 80, blood pressure 153/84.

GENERAL:  He is in no acute distress, alert and oriented x3.

LUNGS:  Clear to auscultation bilaterally.

HEART:  Regular rate and rhythm.

ABDOMEN:  Soft, nontender, nondistended.  Bowel sounds are present.

EXTREMITIES:  No lower extremity edema.

 

IMPRESSION:  Iron deficiency anemia.  EGD and colonoscopy were negative for bleeding source.  Duodena
l biopsies are pending.  Possible small bowel lesion was noted on admission CT with dilation of the s
mall bowel up to a point and decompression distal to that.

 

RECOMMENDATIONS:  CT enterography has been scheduled.  If this confirms a small bowel lesion or narro
wing, then surgical consultation will be the next step.  If the small bowel appears normal with this 
with no narrowing, then the next step will be outpatient capsule endoscopy.

## 2018-08-18 NOTE — CT
PRE AND POSTCONTRAST ENHANCED CT IMAGES ABDOMEN AND PELVIS:

 

Date:  08/18/18 

 

HISTORY:  

Abdominal pain.

 

FINDINGS:

The lung bases are unremarkable. 

 

No evidence of free intraperitoneal air seen. 

 

No dilated loops of small bowel seen. The small bowel lumen is fairly symmetric throughout the small 
bowel. 

 

The liver, spleen, gallbladder, pancreas, adrenal glands, and kidneys are unremarkable. 

 

IMPRESSION: 

Normal pre and postcontrast enhanced CT images of the abdomen and pelvis. 

Some descending colonic diverticulosis is present. 

 

 

POS: [facility]